# Patient Record
Sex: FEMALE | NOT HISPANIC OR LATINO | Employment: FULL TIME | URBAN - METROPOLITAN AREA
[De-identification: names, ages, dates, MRNs, and addresses within clinical notes are randomized per-mention and may not be internally consistent; named-entity substitution may affect disease eponyms.]

---

## 2017-04-12 ENCOUNTER — OFFICE VISIT (OUTPATIENT)
Dept: URGENT CARE | Facility: PHYSICIAN GROUP | Age: 42
End: 2017-04-12
Payer: COMMERCIAL

## 2017-04-12 ENCOUNTER — HOSPITAL ENCOUNTER (OUTPATIENT)
Facility: MEDICAL CENTER | Age: 42
End: 2017-04-12
Attending: FAMILY MEDICINE
Payer: COMMERCIAL

## 2017-04-12 VITALS
DIASTOLIC BLOOD PRESSURE: 76 MMHG | BODY MASS INDEX: 22.5 KG/M2 | SYSTOLIC BLOOD PRESSURE: 112 MMHG | WEIGHT: 140 LBS | TEMPERATURE: 97.4 F | HEART RATE: 70 BPM | OXYGEN SATURATION: 97 % | HEIGHT: 66 IN

## 2017-04-12 DIAGNOSIS — L98.9 SKIN LESION: ICD-10-CM

## 2017-04-12 PROCEDURE — 87070 CULTURE OTHR SPECIMN AEROBIC: CPT

## 2017-04-12 PROCEDURE — 87205 SMEAR GRAM STAIN: CPT

## 2017-04-12 PROCEDURE — 99204 OFFICE O/P NEW MOD 45 MIN: CPT | Performed by: FAMILY MEDICINE

## 2017-04-12 PROCEDURE — 87077 CULTURE AEROBIC IDENTIFY: CPT

## 2017-04-12 RX ORDER — SULFAMETHOXAZOLE AND TRIMETHOPRIM 800; 160 MG/1; MG/1
2 TABLET ORAL 2 TIMES DAILY
Qty: 20 TAB | Refills: 0 | Status: SHIPPED | OUTPATIENT
Start: 2017-04-12 | End: 2017-04-17

## 2017-04-12 RX ORDER — TRIAMCINOLONE ACETONIDE 1 MG/G
1 OINTMENT TOPICAL 2 TIMES DAILY
Qty: 1 TUBE | Refills: 0 | Status: SHIPPED | OUTPATIENT
Start: 2017-04-12

## 2017-04-12 NOTE — PROGRESS NOTES
"Subjective:      Chief Complaint   Patient presents with   • Abrasion     right arm, x 1 week, red and itchy                Rash  This is a new problem. The current episode started in the past 7 days. The problem is unchanged. The rash is on rt forearm.  Denies trauma. The rash is characterized by redness, swelling and itchiness. Pt was exposed to nothing. Pertinent negatives include no cough, fatigue, fever, shortness of breath or sore throat. Past treatments include nothing.   No hx skin cancer      Past medical history was unremarkable and not pertinent to current issue      Social History   Substance Use Topics   • Smoking status: Never Smoker    • Smokeless tobacco: Never Used   • Alcohol Use: No         Current Outpatient Prescriptions on File Prior to Visit   Medication Sig Dispense Refill   • fluconazole (DIFLUCAN) 150 MG tablet Take 1 Tab by mouth every day. 1 Tab 0   • ibuprofen (MOTRIN) 200 MG TABS Take 200 mg by mouth every 6 hours as needed.       No current facility-administered medications on file prior to visit.       Family history was reviewed and not pertinent         Review of Systems   Constitutional: Negative for fever, chills and weight loss.   HENT - denies cough, ear pain, congestion, sore throat  Eyes: denies vision changes  Respiratory: Negative for cough and wheezing.    Cardiovascular: Negative for chest pain.   Gastrointestinal:  No abdominal pain,  nausea, vomiting, diarrhea.  Negative for  blood in stool.    - no discharge, dysuria, frequency.      Neurological: Negative for dizziness and headaches.   musculoskeletal - denies myalgias, calf pain  Psych - denies anxiety/depression/mood changes.  Skin: pos for   rash  All other systems reviewed and are negative.            Objective:   Blood pressure 112/76, pulse 70, temperature 36.3 °C (97.4 °F), height 1.676 m (5' 6\"), weight 63.504 kg (140 lb), SpO2 97 %.    Physical Exam   Constitutional: pt is oriented to person, place, and " time. Pt appears well-developed and well-nourished. No distress.   HENT:   Head: Normocephalic and atraumatic.   Mouth/Throat: Oropharynx is clear and moist and mucous membranes are normal. No uvula swelling. No oropharyngeal exudate, posterior oropharyngeal edema or posterior oropharyngeal erythema.   Eyes: Conjunctivae are normal.   Cardiovascular: Normal rate, regular rhythm and normal heart sounds.    Pulmonary/Chest: Effort normal and breath sounds normal. No respiratory distress. She has no wheezes.   Neurological: pt is alert and oriented to person, place, and time. No cranial nerve deficit.   Skin: rt forearm - there is a raised, erythematous papular lesion with thick, rolled edges and some surrounding erythema and some serous drainage from the center  ) noted. Pt is not diaphoretic. There is erythema.   Psychiatric: pt's behavior is normal.   Nursing note and vitals reviewed.              Assessment/Plan:     1. Skin lesion  Suspicious for squamous cell carcinoma - will send to derm for biopsy  In the meantime, will treat for possible cellulitis - Bactrim  Wound culture taken.     - sulfamethoxazole-trimethoprim (BACTRIM DS) 800-160 MG tablet; Take 2 Tabs by mouth 2 times a day for 5 days.  Dispense: 20 Tab; Refill: 0  - REFERRAL TO DERMATOLOGY  - triamcinolone acetonide (KENALOG) 0.1 % Ointment; Apply 1 Application to affected area(s) 2 times a day.  Dispense: 1 Tube; Refill: 0  - CULTURE WOUND W/ GRAM STAIN

## 2017-04-13 LAB
GRAM STN SPEC: NORMAL
SIGNIFICANT IND 70042: NORMAL
SITE SITE: NORMAL
SOURCE SOURCE: NORMAL

## 2017-04-15 ENCOUNTER — TELEPHONE (OUTPATIENT)
Dept: URGENT CARE | Facility: PHYSICIAN GROUP | Age: 42
End: 2017-04-15

## 2017-04-15 LAB
BACTERIA WND AEROBE CULT: ABNORMAL
BACTERIA WND AEROBE CULT: ABNORMAL
GRAM STN SPEC: ABNORMAL
SIGNIFICANT IND 70042: ABNORMAL
SITE SITE: ABNORMAL
SOURCE SOURCE: ABNORMAL

## 2017-04-15 NOTE — TELEPHONE ENCOUNTER
----- Message from Nitin Harrington M.D. sent at 4/14/2017  6:20 PM PDT -----  Please inform pt - wound culture negative.     F/u with derm

## 2017-04-19 ENCOUNTER — RX ONLY (OUTPATIENT)
Age: 42
Setting detail: RX ONLY
End: 2017-04-19